# Patient Record
Sex: FEMALE | Race: WHITE | NOT HISPANIC OR LATINO
[De-identification: names, ages, dates, MRNs, and addresses within clinical notes are randomized per-mention and may not be internally consistent; named-entity substitution may affect disease eponyms.]

---

## 2018-12-13 PROBLEM — Z96.1 PSEUDOPHAKIA OF BOTH EYES: Status: ACTIVE | Noted: 2018-12-13

## 2018-12-13 PROBLEM — H43.812 PVD (POSTERIOR VITREOUS DETACHMENT), LEFT EYE: Status: ACTIVE | Noted: 2018-12-13

## 2019-06-20 ENCOUNTER — HOSPITAL (OUTPATIENT)
Dept: OTHER | Age: 59
End: 2019-06-20
Attending: FAMILY MEDICINE

## 2019-06-20 LAB
CONTROL LINE: PRESENT
Lab: CLEAR
STREP POC: NEGATIVE
UA APPEAR POC: CLEAR
UA BILI POC: NEGATIVE
UA BLOOD POC: ABNORMAL
UA COLOR POC: YELLOW
UA GLUCOSE POC: NEGATIVE
UA KETONES POC: NEGATIVE
UA LEUK EST POC: ABNORMAL
UA NITRITE POC: POSITIVE
UA PH POC: 5.5 (ref 5–7)
UA PROTEIN POC: NEGATIVE
UA SPEC GRAV POC: 1.02 (ref 1.01–1.02)
UA UROBILIN POC: 0.2 MG/DL

## 2019-08-27 ENCOUNTER — WALK IN (OUTPATIENT)
Dept: URGENT CARE | Age: 59
End: 2019-08-27

## 2019-08-27 VITALS
BODY MASS INDEX: 22.13 KG/M2 | TEMPERATURE: 98.4 F | WEIGHT: 146 LBS | HEIGHT: 68 IN | SYSTOLIC BLOOD PRESSURE: 122 MMHG | OXYGEN SATURATION: 98 % | DIASTOLIC BLOOD PRESSURE: 60 MMHG | HEART RATE: 84 BPM

## 2019-08-27 DIAGNOSIS — H02.843 SWELLING OF RIGHT EYELID: Primary | ICD-10-CM

## 2019-08-27 PROCEDURE — 99203 OFFICE O/P NEW LOW 30 MIN: CPT | Performed by: NURSE PRACTITIONER

## 2019-08-27 SDOH — HEALTH STABILITY: MENTAL HEALTH: HOW OFTEN DO YOU HAVE A DRINK CONTAINING ALCOHOL?: NEVER

## 2019-08-27 ASSESSMENT — VISUAL ACUITY: OU: 1

## 2023-11-02 RX ORDER — SENNOSIDES 8.6 MG
8.6 TABLET ORAL NIGHTLY
COMMUNITY

## 2023-11-02 RX ORDER — ALENDRONATE SODIUM 70 MG/1
70 TABLET ORAL
COMMUNITY

## 2023-11-02 RX ORDER — ASPIRIN 81 MG/1
81 TABLET ORAL DAILY
Status: ON HOLD | COMMUNITY
End: 2023-11-25

## 2023-11-02 RX ORDER — GABAPENTIN 300 MG/1
300 CAPSULE ORAL NIGHTLY
Status: ON HOLD | COMMUNITY
End: 2023-11-25

## 2023-11-21 ENCOUNTER — ANESTHESIA EVENT (OUTPATIENT)
Dept: SURGERY | Facility: HOSPITAL | Age: 63
End: 2023-11-21
Payer: COMMERCIAL

## 2023-11-22 ENCOUNTER — HOSPITAL ENCOUNTER (INPATIENT)
Facility: HOSPITAL | Age: 63
LOS: 3 days | Discharge: HOME | End: 2023-11-25
Attending: ORTHOPAEDIC SURGERY | Admitting: ORTHOPAEDIC SURGERY
Payer: COMMERCIAL

## 2023-11-22 ENCOUNTER — APPOINTMENT (OUTPATIENT)
Dept: GENERAL RADIOLOGY | Facility: HOSPITAL | Age: 63
End: 2023-11-22
Attending: ORTHOPAEDIC SURGERY
Payer: COMMERCIAL

## 2023-11-22 ENCOUNTER — ANESTHESIA (OUTPATIENT)
Dept: SURGERY | Facility: HOSPITAL | Age: 63
End: 2023-11-22
Payer: COMMERCIAL

## 2023-11-22 PROBLEM — M54.16 LUMBAR RADICULOPATHY: Status: ACTIVE | Noted: 2023-11-22

## 2023-11-22 PROCEDURE — 0SG0071 FUSION OF LUMBAR VERTEBRAL JOINT WITH AUTOLOGOUS TISSUE SUBSTITUTE, POSTERIOR APPROACH, POSTERIOR COLUMN, OPEN APPROACH: ICD-10-PCS | Performed by: ORTHOPAEDIC SURGERY

## 2023-11-22 PROCEDURE — 0ST20ZZ RESECTION OF LUMBAR VERTEBRAL DISC, OPEN APPROACH: ICD-10-PCS | Performed by: ORTHOPAEDIC SURGERY

## 2023-11-22 PROCEDURE — 0SJ00ZZ INSPECTION OF LUMBAR VERTEBRAL JOINT, OPEN APPROACH: ICD-10-PCS | Performed by: ORTHOPAEDIC SURGERY

## 2023-11-22 PROCEDURE — 01NB0ZZ RELEASE LUMBAR NERVE, OPEN APPROACH: ICD-10-PCS | Performed by: ORTHOPAEDIC SURGERY

## 2023-11-22 PROCEDURE — 76000 FLUOROSCOPY <1 HR PHYS/QHP: CPT | Performed by: ORTHOPAEDIC SURGERY

## 2023-11-22 PROCEDURE — 0QS00ZZ REPOSITION LUMBAR VERTEBRA, OPEN APPROACH: ICD-10-PCS | Performed by: ORTHOPAEDIC SURGERY

## 2023-11-22 PROCEDURE — 0SG00AJ FUSION OF LUMBAR VERTEBRAL JOINT WITH INTERBODY FUSION DEVICE, POSTERIOR APPROACH, ANTERIOR COLUMN, OPEN APPROACH: ICD-10-PCS | Performed by: ORTHOPAEDIC SURGERY

## 2023-11-22 PROCEDURE — 4A11X4G MONITORING OF PERIPHERAL NERVOUS ELECTRICAL ACTIVITY, INTRAOPERATIVE, EXTERNAL APPROACH: ICD-10-PCS | Performed by: ORTHOPAEDIC SURGERY

## 2023-11-22 DEVICE — SCREW SPNL DIA5.5MM OPN TULIP LOK RELINE: Type: IMPLANTABLE DEVICE | Site: BACK | Status: FUNCTIONAL

## 2023-11-22 DEVICE — COHERE TLIF-O, 14X10X30MM 12°
Type: IMPLANTABLE DEVICE | Site: BACK | Status: FUNCTIONAL
Brand: COHERE

## 2023-11-22 DEVICE — OSTEOCEL PRO LARGE BULK BUY: Type: IMPLANTABLE DEVICE | Site: BACK | Status: FUNCTIONAL

## 2023-11-22 DEVICE — SCREW SPNL L45MM DIA6.5MM POST: Type: IMPLANTABLE DEVICE | Site: BACK | Status: FUNCTIONAL

## 2023-11-22 DEVICE — ROD SPNL L65MM DIA5.5MM POST: Type: IMPLANTABLE DEVICE | Site: BACK | Status: FUNCTIONAL

## 2023-11-22 DEVICE — IMPLANTABLE DEVICE: Type: IMPLANTABLE DEVICE | Site: BACK | Status: FUNCTIONAL

## 2023-11-22 DEVICE — ATTRAX® SCAFFOLD STRIPS, SMALL
Type: IMPLANTABLE DEVICE | Site: BACK | Status: FUNCTIONAL
Brand: ATTRAX

## 2023-11-22 DEVICE — SCREW SPNL L45MM OD6.5MM 2C REDUC RELINE: Type: IMPLANTABLE DEVICE | Site: BACK | Status: FUNCTIONAL

## 2023-11-22 DEVICE — K WIRE FIX NIT BVL BLNT TIP PRECEPT: Type: IMPLANTABLE DEVICE | Site: BACK

## 2023-11-22 DEVICE — BONE GRAFT KIT 7510100 INFUSE X SMALL
Type: IMPLANTABLE DEVICE | Site: BACK | Status: FUNCTIONAL
Brand: INFUSE® BONE GRAFT

## 2023-11-22 DEVICE — EXTENSION SPNL REDUC TULIP RELINE-O MOD MAS: Type: IMPLANTABLE DEVICE | Site: BACK | Status: FUNCTIONAL

## 2023-11-22 RX ORDER — LIDOCAINE HYDROCHLORIDE 10 MG/ML
INJECTION, SOLUTION EPIDURAL; INFILTRATION; INTRACAUDAL; PERINEURAL AS NEEDED
Status: DISCONTINUED | OUTPATIENT
Start: 2023-11-22 | End: 2023-11-22 | Stop reason: SURG

## 2023-11-22 RX ORDER — HYDROCODONE BITARTRATE AND ACETAMINOPHEN 5; 325 MG/1; MG/1
1 TABLET ORAL ONCE AS NEEDED
Status: DISCONTINUED | OUTPATIENT
Start: 2023-11-22 | End: 2023-11-22 | Stop reason: HOSPADM

## 2023-11-22 RX ORDER — SENNOSIDES 8.6 MG
17.2 TABLET ORAL NIGHTLY
Status: DISCONTINUED | OUTPATIENT
Start: 2023-11-22 | End: 2023-11-25

## 2023-11-22 RX ORDER — GABAPENTIN 300 MG/1
300 CAPSULE ORAL NIGHTLY
Status: DISCONTINUED | OUTPATIENT
Start: 2023-11-22 | End: 2023-11-24

## 2023-11-22 RX ORDER — DOCUSATE SODIUM 100 MG/1
100 CAPSULE, LIQUID FILLED ORAL 2 TIMES DAILY
Status: DISCONTINUED | OUTPATIENT
Start: 2023-11-22 | End: 2023-11-25

## 2023-11-22 RX ORDER — HYDROCODONE BITARTRATE AND ACETAMINOPHEN 5; 325 MG/1; MG/1
TABLET ORAL
COMMUNITY
Start: 2023-11-14 | End: 2023-11-25

## 2023-11-22 RX ORDER — DIPHENHYDRAMINE HCL 25 MG
25 CAPSULE ORAL EVERY 4 HOURS PRN
Status: DISCONTINUED | OUTPATIENT
Start: 2023-11-22 | End: 2023-11-25

## 2023-11-22 RX ORDER — POLYETHYLENE GLYCOL 3350 17 G/17G
17 POWDER, FOR SOLUTION ORAL DAILY PRN
Status: DISCONTINUED | OUTPATIENT
Start: 2023-11-22 | End: 2023-11-25

## 2023-11-22 RX ORDER — ATORVASTATIN CALCIUM 40 MG/1
40 TABLET, FILM COATED ORAL NIGHTLY
Status: DISCONTINUED | OUTPATIENT
Start: 2023-11-22 | End: 2023-11-25

## 2023-11-22 RX ORDER — GLYCOPYRROLATE 0.2 MG/ML
INJECTION, SOLUTION INTRAMUSCULAR; INTRAVENOUS AS NEEDED
Status: DISCONTINUED | OUTPATIENT
Start: 2023-11-22 | End: 2023-11-22 | Stop reason: SURG

## 2023-11-22 RX ORDER — CEFAZOLIN SODIUM/WATER 2 G/20 ML
2 SYRINGE (ML) INTRAVENOUS EVERY 8 HOURS
Status: COMPLETED | OUTPATIENT
Start: 2023-11-22 | End: 2023-11-22

## 2023-11-22 RX ORDER — PROCHLORPERAZINE EDISYLATE 5 MG/ML
5 INJECTION INTRAMUSCULAR; INTRAVENOUS EVERY 8 HOURS PRN
Status: DISCONTINUED | OUTPATIENT
Start: 2023-11-22 | End: 2023-11-22 | Stop reason: HOSPADM

## 2023-11-22 RX ORDER — MEPERIDINE HYDROCHLORIDE 25 MG/ML
12.5 INJECTION INTRAMUSCULAR; INTRAVENOUS; SUBCUTANEOUS AS NEEDED
Status: DISCONTINUED | OUTPATIENT
Start: 2023-11-22 | End: 2023-11-22 | Stop reason: HOSPADM

## 2023-11-22 RX ORDER — CELECOXIB 200 MG/1
200 CAPSULE ORAL ONCE
Status: COMPLETED | OUTPATIENT
Start: 2023-11-22 | End: 2023-11-22

## 2023-11-22 RX ORDER — MIDAZOLAM HYDROCHLORIDE 1 MG/ML
INJECTION INTRAMUSCULAR; INTRAVENOUS AS NEEDED
Status: DISCONTINUED | OUTPATIENT
Start: 2023-11-22 | End: 2023-11-22 | Stop reason: SURG

## 2023-11-22 RX ORDER — ONDANSETRON 2 MG/ML
4 INJECTION INTRAMUSCULAR; INTRAVENOUS EVERY 6 HOURS PRN
Status: DISCONTINUED | OUTPATIENT
Start: 2023-11-22 | End: 2023-11-25

## 2023-11-22 RX ORDER — METHOCARBAMOL 750 MG/1
750 TABLET, FILM COATED ORAL EVERY 6 HOURS PRN
Status: DISCONTINUED | OUTPATIENT
Start: 2023-11-22 | End: 2023-11-25

## 2023-11-22 RX ORDER — LEVOTHYROXINE SODIUM 0.05 MG/1
50 TABLET ORAL
Status: DISCONTINUED | OUTPATIENT
Start: 2023-11-23 | End: 2023-11-25

## 2023-11-22 RX ORDER — METHOCARBAMOL 500 MG/1
500 TABLET, FILM COATED ORAL 3 TIMES DAILY
COMMUNITY
Start: 2023-11-14

## 2023-11-22 RX ORDER — PHENYLEPHRINE HCL 10 MG/ML
VIAL (ML) INJECTION AS NEEDED
Status: DISCONTINUED | OUTPATIENT
Start: 2023-11-22 | End: 2023-11-22 | Stop reason: SURG

## 2023-11-22 RX ORDER — HYDROCODONE BITARTRATE AND ACETAMINOPHEN 5; 325 MG/1; MG/1
2 TABLET ORAL ONCE AS NEEDED
Status: DISCONTINUED | OUTPATIENT
Start: 2023-11-22 | End: 2023-11-22 | Stop reason: HOSPADM

## 2023-11-22 RX ORDER — METHOCARBAMOL 100 MG/ML
1 INJECTION, SOLUTION INTRAMUSCULAR; INTRAVENOUS ONCE
Status: COMPLETED | OUTPATIENT
Start: 2023-11-22 | End: 2023-11-22

## 2023-11-22 RX ORDER — ACETAMINOPHEN 500 MG
1000 TABLET ORAL ONCE
Status: DISCONTINUED | OUTPATIENT
Start: 2023-11-22 | End: 2023-11-22 | Stop reason: HOSPADM

## 2023-11-22 RX ORDER — DIPHENHYDRAMINE HYDROCHLORIDE 50 MG/ML
25 INJECTION INTRAMUSCULAR; INTRAVENOUS EVERY 4 HOURS PRN
Status: DISCONTINUED | OUTPATIENT
Start: 2023-11-22 | End: 2023-11-25

## 2023-11-22 RX ORDER — HYDROMORPHONE HYDROCHLORIDE 1 MG/ML
0.4 INJECTION, SOLUTION INTRAMUSCULAR; INTRAVENOUS; SUBCUTANEOUS EVERY 5 MIN PRN
Status: DISCONTINUED | OUTPATIENT
Start: 2023-11-22 | End: 2023-11-22 | Stop reason: HOSPADM

## 2023-11-22 RX ORDER — CEFAZOLIN SODIUM/WATER 2 G/20 ML
2 SYRINGE (ML) INTRAVENOUS ONCE
Status: COMPLETED | OUTPATIENT
Start: 2023-11-22 | End: 2023-11-22

## 2023-11-22 RX ORDER — ONDANSETRON 2 MG/ML
INJECTION INTRAMUSCULAR; INTRAVENOUS AS NEEDED
Status: DISCONTINUED | OUTPATIENT
Start: 2023-11-22 | End: 2023-11-22 | Stop reason: SURG

## 2023-11-22 RX ORDER — LEVOTHYROXINE SODIUM 0.05 MG/1
1 TABLET ORAL DAILY
COMMUNITY
Start: 2023-03-23

## 2023-11-22 RX ORDER — DIAZEPAM 5 MG/1
5 TABLET ORAL EVERY 6 HOURS PRN
Status: DISCONTINUED | OUTPATIENT
Start: 2023-11-22 | End: 2023-11-25

## 2023-11-22 RX ORDER — TRANEXAMIC ACID 10 MG/ML
INJECTION, SOLUTION INTRAVENOUS AS NEEDED
Status: DISCONTINUED | OUTPATIENT
Start: 2023-11-22 | End: 2023-11-22 | Stop reason: SURG

## 2023-11-22 RX ORDER — MIDAZOLAM HYDROCHLORIDE 1 MG/ML
1 INJECTION INTRAMUSCULAR; INTRAVENOUS EVERY 5 MIN PRN
Status: DISCONTINUED | OUTPATIENT
Start: 2023-11-22 | End: 2023-11-22 | Stop reason: HOSPADM

## 2023-11-22 RX ORDER — HYDROMORPHONE HYDROCHLORIDE 1 MG/ML
INJECTION, SOLUTION INTRAMUSCULAR; INTRAVENOUS; SUBCUTANEOUS
Status: COMPLETED
Start: 2023-11-22 | End: 2023-11-22

## 2023-11-22 RX ORDER — ONDANSETRON 2 MG/ML
4 INJECTION INTRAMUSCULAR; INTRAVENOUS ONCE
Status: COMPLETED | OUTPATIENT
Start: 2023-11-22 | End: 2023-11-22

## 2023-11-22 RX ORDER — KETAMINE HYDROCHLORIDE 50 MG/ML
INJECTION, SOLUTION, CONCENTRATE INTRAMUSCULAR; INTRAVENOUS AS NEEDED
Status: DISCONTINUED | OUTPATIENT
Start: 2023-11-22 | End: 2023-11-22 | Stop reason: SURG

## 2023-11-22 RX ORDER — PROCHLORPERAZINE EDISYLATE 5 MG/ML
5 INJECTION INTRAMUSCULAR; INTRAVENOUS EVERY 8 HOURS PRN
Status: DISCONTINUED | OUTPATIENT
Start: 2023-11-22 | End: 2023-11-25

## 2023-11-22 RX ORDER — HYDROMORPHONE HYDROCHLORIDE 1 MG/ML
0.4 INJECTION, SOLUTION INTRAMUSCULAR; INTRAVENOUS; SUBCUTANEOUS EVERY 2 HOUR PRN
Status: DISCONTINUED | OUTPATIENT
Start: 2023-11-22 | End: 2023-11-25

## 2023-11-22 RX ORDER — BISACODYL 10 MG
10 SUPPOSITORY, RECTAL RECTAL
Status: DISCONTINUED | OUTPATIENT
Start: 2023-11-22 | End: 2023-11-25

## 2023-11-22 RX ORDER — HYDROMORPHONE HYDROCHLORIDE 1 MG/ML
0.2 INJECTION, SOLUTION INTRAMUSCULAR; INTRAVENOUS; SUBCUTANEOUS EVERY 5 MIN PRN
Status: DISCONTINUED | OUTPATIENT
Start: 2023-11-22 | End: 2023-11-22 | Stop reason: HOSPADM

## 2023-11-22 RX ORDER — HYDROMORPHONE HYDROCHLORIDE 1 MG/ML
0.8 INJECTION, SOLUTION INTRAMUSCULAR; INTRAVENOUS; SUBCUTANEOUS EVERY 2 HOUR PRN
Status: DISCONTINUED | OUTPATIENT
Start: 2023-11-22 | End: 2023-11-25

## 2023-11-22 RX ORDER — ACETAMINOPHEN 500 MG
1000 TABLET ORAL ONCE AS NEEDED
Status: DISCONTINUED | OUTPATIENT
Start: 2023-11-22 | End: 2023-11-22 | Stop reason: HOSPADM

## 2023-11-22 RX ORDER — ONDANSETRON 2 MG/ML
4 INJECTION INTRAMUSCULAR; INTRAVENOUS EVERY 6 HOURS PRN
Status: DISCONTINUED | OUTPATIENT
Start: 2023-11-22 | End: 2023-11-22 | Stop reason: HOSPADM

## 2023-11-22 RX ORDER — SODIUM CHLORIDE, SODIUM LACTATE, POTASSIUM CHLORIDE, CALCIUM CHLORIDE 600; 310; 30; 20 MG/100ML; MG/100ML; MG/100ML; MG/100ML
INJECTION, SOLUTION INTRAVENOUS CONTINUOUS
Status: DISCONTINUED | OUTPATIENT
Start: 2023-11-22 | End: 2023-11-22 | Stop reason: HOSPADM

## 2023-11-22 RX ORDER — ENEMA 19; 7 G/133ML; G/133ML
1 ENEMA RECTAL ONCE AS NEEDED
Status: DISCONTINUED | OUTPATIENT
Start: 2023-11-22 | End: 2023-11-25

## 2023-11-22 RX ORDER — NALOXONE HYDROCHLORIDE 0.4 MG/ML
0.08 INJECTION, SOLUTION INTRAMUSCULAR; INTRAVENOUS; SUBCUTANEOUS AS NEEDED
Status: DISCONTINUED | OUTPATIENT
Start: 2023-11-22 | End: 2023-11-22 | Stop reason: HOSPADM

## 2023-11-22 RX ORDER — KETOROLAC TROMETHAMINE 30 MG/ML
INJECTION, SOLUTION INTRAMUSCULAR; INTRAVENOUS AS NEEDED
Status: DISCONTINUED | OUTPATIENT
Start: 2023-11-22 | End: 2023-11-22 | Stop reason: SURG

## 2023-11-22 RX ORDER — DEXAMETHASONE SODIUM PHOSPHATE 4 MG/ML
VIAL (ML) INJECTION AS NEEDED
Status: DISCONTINUED | OUTPATIENT
Start: 2023-11-22 | End: 2023-11-22 | Stop reason: SURG

## 2023-11-22 RX ORDER — OXYCODONE HYDROCHLORIDE 10 MG/1
10 TABLET ORAL EVERY 4 HOURS PRN
Status: DISCONTINUED | OUTPATIENT
Start: 2023-11-22 | End: 2023-11-23

## 2023-11-22 RX ORDER — SODIUM CHLORIDE, SODIUM LACTATE, POTASSIUM CHLORIDE, CALCIUM CHLORIDE 600; 310; 30; 20 MG/100ML; MG/100ML; MG/100ML; MG/100ML
INJECTION, SOLUTION INTRAVENOUS CONTINUOUS
Status: DISCONTINUED | OUTPATIENT
Start: 2023-11-22 | End: 2023-11-25

## 2023-11-22 RX ORDER — OXYCODONE HYDROCHLORIDE 5 MG/1
5 TABLET ORAL EVERY 4 HOURS PRN
Status: DISCONTINUED | OUTPATIENT
Start: 2023-11-22 | End: 2023-11-23

## 2023-11-22 RX ORDER — HYDROMORPHONE HYDROCHLORIDE 1 MG/ML
0.6 INJECTION, SOLUTION INTRAMUSCULAR; INTRAVENOUS; SUBCUTANEOUS EVERY 5 MIN PRN
Status: DISCONTINUED | OUTPATIENT
Start: 2023-11-22 | End: 2023-11-22 | Stop reason: HOSPADM

## 2023-11-22 RX ADMIN — SODIUM CHLORIDE, SODIUM LACTATE, POTASSIUM CHLORIDE, CALCIUM CHLORIDE: 600; 310; 30; 20 INJECTION, SOLUTION INTRAVENOUS at 09:06:00

## 2023-11-22 RX ADMIN — TRANEXAMIC ACID 1000 MG: 10 INJECTION, SOLUTION INTRAVENOUS at 07:57:00

## 2023-11-22 RX ADMIN — PHENYLEPHRINE HCL 100 MCG: 10 MG/ML VIAL (ML) INJECTION at 10:02:00

## 2023-11-22 RX ADMIN — METHOCARBAMOL 1 G: 100 INJECTION, SOLUTION INTRAMUSCULAR; INTRAVENOUS at 09:35:00

## 2023-11-22 RX ADMIN — PHENYLEPHRINE HCL 100 MCG: 10 MG/ML VIAL (ML) INJECTION at 08:22:00

## 2023-11-22 RX ADMIN — SODIUM CHLORIDE, SODIUM LACTATE, POTASSIUM CHLORIDE, CALCIUM CHLORIDE: 600; 310; 30; 20 INJECTION, SOLUTION INTRAVENOUS at 07:48:00

## 2023-11-22 RX ADMIN — KETOROLAC TROMETHAMINE 30 MG: 30 INJECTION, SOLUTION INTRAMUSCULAR; INTRAVENOUS at 10:00:00

## 2023-11-22 RX ADMIN — PHENYLEPHRINE HCL 100 MCG: 10 MG/ML VIAL (ML) INJECTION at 08:42:00

## 2023-11-22 RX ADMIN — DEXAMETHASONE SODIUM PHOSPHATE 8 MG: 4 MG/ML VIAL (ML) INJECTION at 07:58:00

## 2023-11-22 RX ADMIN — PHENYLEPHRINE HCL 100 MCG: 10 MG/ML VIAL (ML) INJECTION at 07:55:00

## 2023-11-22 RX ADMIN — PHENYLEPHRINE HCL 100 MCG: 10 MG/ML VIAL (ML) INJECTION at 08:52:00

## 2023-11-22 RX ADMIN — LIDOCAINE HYDROCHLORIDE 50 MG: 10 INJECTION, SOLUTION EPIDURAL; INFILTRATION; INTRACAUDAL; PERINEURAL at 07:51:00

## 2023-11-22 RX ADMIN — CEFAZOLIN SODIUM/WATER 2 G: 2 G/20 ML SYRINGE (ML) INTRAVENOUS at 07:53:00

## 2023-11-22 RX ADMIN — PHENYLEPHRINE HCL 200 MCG: 10 MG/ML VIAL (ML) INJECTION at 08:11:00

## 2023-11-22 RX ADMIN — GLYCOPYRROLATE 0.2 MG: 0.2 INJECTION, SOLUTION INTRAMUSCULAR; INTRAVENOUS at 08:09:00

## 2023-11-22 RX ADMIN — PHENYLEPHRINE HCL 100 MCG: 10 MG/ML VIAL (ML) INJECTION at 08:03:00

## 2023-11-22 RX ADMIN — MIDAZOLAM HYDROCHLORIDE 2 MG: 1 INJECTION INTRAMUSCULAR; INTRAVENOUS at 07:48:00

## 2023-11-22 RX ADMIN — PHENYLEPHRINE HCL 100 MCG: 10 MG/ML VIAL (ML) INJECTION at 08:09:00

## 2023-11-22 RX ADMIN — SODIUM CHLORIDE, SODIUM LACTATE, POTASSIUM CHLORIDE, CALCIUM CHLORIDE: 600; 310; 30; 20 INJECTION, SOLUTION INTRAVENOUS at 10:14:00

## 2023-11-22 RX ADMIN — PHENYLEPHRINE HCL 100 MCG: 10 MG/ML VIAL (ML) INJECTION at 09:38:00

## 2023-11-22 RX ADMIN — KETAMINE HYDROCHLORIDE 25 MG: 50 INJECTION, SOLUTION, CONCENTRATE INTRAMUSCULAR; INTRAVENOUS at 07:51:00

## 2023-11-22 RX ADMIN — ONDANSETRON 4 MG: 2 INJECTION INTRAMUSCULAR; INTRAVENOUS at 10:00:00

## 2023-11-22 RX ADMIN — PHENYLEPHRINE HCL 200 MCG: 10 MG/ML VIAL (ML) INJECTION at 08:12:00

## 2023-11-22 NOTE — BRIEF OP NOTE
Pre-Operative Diagnosis: KYPHOSIS, HISTORY FUSION, STENOSIS, DISCHERNIATION     Post-Operative Diagnosis: KYPHOSIS, HISTORY FUSION, STENOSIS, DISCHERNIATION      Procedure Performed:   LUMBAR 3 - LUMBAR 4, LUMBAR 4 - LUMBAR 5 REVISION DECOMPRESSION WITH TRANSFORAMINAL LUMBAR INTERBODY FUSION-INSTRUMENTED    Surgeon(s) and Role:     * Beka Baum MD - Primary    Assistant(s):  PA: Aisha Beal PA-C     Surgical Findings: Above     Specimen: none     Estimated Blood Loss: Blood Output: 50 mL (11/22/2023  9:37 AM)      Dictation Number:      Loly Blanton PA-C  11/22/2023  10:07 AM

## 2023-11-22 NOTE — PLAN OF CARE
Patient alert and oriented x4. VSS, on RA. Moderate pain/spasms reported to lower back and RLE, PRN medications given with reported relief. Gel ice wrap intermittently placed. Chairback brace in place when OOB, pt got OOB x1 person assist. Carr in place, draining clear yellow urine. Pt tolerating diet, denies N/V. Pt denies N/T. Microfoam in place to lower back incision site, CDI. DELTA's and SCD's in place. Plan: Pain management, PT/OT to see tomorrow. 1730: Pt up and ambulating in halls, tolerating PO intake.  Carr catheter removed, DTV 1938

## 2023-11-22 NOTE — ANESTHESIA POSTPROCEDURE EVALUATION
[facility] Patient Status:  Inpatient   Age/Gender 58year old female MRN [id number]   Location [address] [facility] Attending Monae Gill MD   Hosp Day # 0 PCP [facility]       Anesthesia Post-op Note    LUMBAR 3 - LUMBAR 4, LUMBAR 4 - LUMBAR 5 REVISION DECOMPRESSION WITH TRANSFORAMINAL LUMBAR INTERBODY FUSION-INSTRUMENTED    Procedure Summary       Date: 11/22/23 Room / Location: [facility]4 [facility] MAIN OR 11 / 140[address] [facility] MAIN OR    Anesthesia Start: 9976 Anesthesia Stop: 6188    Procedures:       LUMBAR 3 - LUMBAR 4, LUMBAR 4 - LUMBAR 5 REVISION DECOMPRESSION WITH TRANSFORAMINAL LUMBAR INTERBODY FUSION-INSTRUMENTED (Left: Spine Lumbar)      INTRAOPERATIVE NEURO MONITORING (Spine Lumbar) Diagnosis: (KYPHOSIS, HISTORY FUSION, STENOSIS, DISCHERNIATION)    Surgeons: Monae Gill MD Anesthesiologist: Kriss Baker MD    Anesthesia Type: general ASA Status: 2            Anesthesia Type: general    Vitals Value Taken Time   /81 11/22/23 1014   Temp 97.2 11/22/23 1014   Pulse 113 11/22/23 1014   Resp 12 11/22/23 1014   SpO2 99 % 11/22/23 1014   Vitals shown include unfiled device data. Patient Location: PACU    Anesthesia Type: general    Airway Patency: patent and extubated    Postop Pain Control: adequate    Mental Status: preanesthetic baseline    Nausea/Vomiting: none    Cardiopulmonary/Hydration status: stable euvolemic    Complications: no apparent anesthesia related complications    Postop vital signs: stable    Dental Exam: Unchanged from Preop    Patient to be discharged from PACU when criteria met.

## 2023-11-22 NOTE — ANESTHESIA PROCEDURE NOTES
Airway  Date/Time: 11/22/2023 7:52 AM  Urgency: elective    Airway not difficult    General Information and Staff    Patient location during procedure: OR  Anesthesiologist: Nikos Bonner MD  Resident/CRNA: Wyatt Her CRNA  Performed: CRNA   Performed by: Wyatt Her CRNA  Authorized by: Nikos Bonner MD      Indications and Patient Condition  Indications for airway management: anesthesia  Spontaneous Ventilation: absent  Sedation level: deep  Preoxygenated: yes  Patient position: sniffing  Mask difficulty assessment: 0 - not attempted    Final Airway Details  Final airway type: endotracheal airway      Successful airway: ETT  Cuffed: yes   Successful intubation technique: direct laryngoscopy  Facilitating devices/methods: intubating stylet and cricoid pressure  Endotracheal tube insertion site: oral  Blade: Manny  Blade size: #3  ETT size (mm): 7.0    Cormack-Lehane Classification: grade IIB - view of arytenoids or posterior of glottis only  Placement verified by: capnometry   Cuff volume (mL): 6  Measured from: lips  ETT to lips (cm): 21  Number of attempts at approach: 1  Number of other approaches attempted: 0    Additional Comments  Dentition per pre op

## 2023-11-23 LAB
HCT VFR BLD AUTO: 37.3 %
HGB BLD-MCNC: 12.5 G/DL

## 2023-11-23 PROCEDURE — 85014 HEMATOCRIT: CPT

## 2023-11-23 PROCEDURE — 97116 GAIT TRAINING THERAPY: CPT

## 2023-11-23 PROCEDURE — 85018 HEMOGLOBIN: CPT

## 2023-11-23 PROCEDURE — 97161 PT EVAL LOW COMPLEX 20 MIN: CPT

## 2023-11-23 PROCEDURE — 97165 OT EVAL LOW COMPLEX 30 MIN: CPT

## 2023-11-23 RX ORDER — OXYCODONE AND ACETAMINOPHEN 10; 325 MG/1; MG/1
2 TABLET ORAL EVERY 4 HOURS PRN
Status: DISCONTINUED | OUTPATIENT
Start: 2023-11-23 | End: 2023-11-25

## 2023-11-23 RX ORDER — OXYCODONE AND ACETAMINOPHEN 10; 325 MG/1; MG/1
1 TABLET ORAL EVERY 4 HOURS PRN
Status: DISCONTINUED | OUTPATIENT
Start: 2023-11-23 | End: 2023-11-25

## 2023-11-23 NOTE — PLAN OF CARE
A/o x4. RA//IS. Teds/scd's/ankle pumps. IV SL. Pre-op symptoms included  pain and burning  to BLE pt states preop symptoms improving and now c/o more of just incision pain. Pain managed with po pain medication and muscle relaxer. Up sb with RW. Voiding. LBM 11/21. Microfoam dsg to incision. Denies n/t. PT/OT to see in AM. POC updated with pt. All safety measures in place. Call light within reach instructed pt to call for help or assistance.

## 2023-11-23 NOTE — PLAN OF CARE
Patient alert and oriented x4. VSS, on RA. Moderate/severe pain reported to back, PO PRN pain medication and muscle relaxants given with reported relief. Pt denies N/T. Lower back surgical site with microfoam tape dressing, CDI. Gel ice wrap intermittently used per patient preference/request. Chairback brace when OOB. Pt voiding freely in bathroom. Tolerating diet, denies N/V. Plan: pain management, PT to see again tomorrow.

## 2023-11-23 NOTE — PHYSICAL THERAPY NOTE
PHYSICAL THERAPY EVALUATION - INPATIENT     Room Number: 381/381-A  Evaluation Date: 11/23/2023  Type of Evaluation: Initial  Physician Order: PT Eval and Treat    Presenting Problem: L3-L5 revision decompression w/ fusion  Co-Morbidities : HHL, hypothryoidism, lumbar surgery 2014  Reason for Therapy: Mobility Dysfunction and Discharge Planning    History related to current admission: Patient is a 58year old female admitted on 11/22/2023 from home for L3-L5 revision decompression w/ fusion by Dr. Shalonda Gauthier. PROCEDURE 11/22/23: LUMBAR 3 - LUMBAR 4, LUMBAR 4 - LUMBAR 5 REVISION DECOMPRESSION WITH TRANSFORAMINAL LUMBAR INTERBODY FUSION-INSTRUMENTED    ASSESSMENT   In this PT evaluation, the patient presents with the following impairments incr pain/discomfort, decr awareness/understanding of spine precautions and impact on ADLs and mobility, decr static and dynamic standing balance, decr activity tolerance/endurance, decr functional mobility. These impairments and comorbidities manifest themselves as functional limitations in independent bed mobility, transfers, and gait. The patient is below baseline and would benefit from skilled inpatient PT to address the above deficits to assist patient in returning to prior to level of function. Functional outcome measures completed include [facility]. The -PAC '6-Clicks' Inpatient Basic Mobility Short Form was completed and this patient is demonstrating a Approx Degree of Impairment: 50.57%  degree of impairment in mobility. Research supports that patients with this level of impairment may benefit from home. DISCHARGE RECOMMENDATIONS  PT Discharge Recommendations: Home    PLAN  PT Treatment Plan: Bed mobility; Body mechanics; Coordination;Don/doff brace; Endurance; Energy conservation;Patient education; Family education; Neuromuscular re-educate;Gait training;Range of motion;Strengthening;Stoop training;Stair training;Transfer training;Balance training  Rehab Potential : Good  Frequency (Obs): Daily  Number of Visits to Meet Established Goals: 2      CURRENT GOALS    Goal #1 Patient is able to demonstrate supine - sit EOB @ level: supervision     Goal #2 Patient is able to demonstrate transfers Sit to/from Stand at assistance level: supervision     Goal #3 Patient is able to ambulate 150 feet with assist device:  LRAD  at assistance level: supervision     Goal #4 Pt able to ascend/descend 3 steps without use of rail at supervision level for safe home navigation. Goal #5 Pt able to demonstrate and maintain spine precautions. Goal #6    Goal Comments: Goals established on 2023    HOME SITUATION  Type of Home: House   Home Layout: Two level; Able to live on main level  Stairs to Enter : 3  Railing: No          Lives With: Spouse  Drives: Yes  Patient Owned Equipment: Rolling walker  Patient Regularly Uses: None    Prior Level of Craig: Per pt typically independent with all aspects of functional mobility. Lives in two story home w/ spouse; bed/bath main floor. 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 They have a house in [place] [facility] [place] which she is hoping to be able to go there in two weeks. SUBJECTIVE  \"I have never had this much pain before. \"      OBJECTIVE  Precautions: Spine;Lumbar brace;Bed/chair alarm  Fall Risk: Standard fall risk    WEIGHT BEARING RESTRICTION  Weight Bearing Restriction: None                PAIN ASSESSMENT  Ratin  Location: low back, incisional  Management Techniques: Activity promotion; Body mechanics;Breathing techniques;Repositioning    COGNITION  Following Commands:  follows multistep commands consistently  Safety Judgement:  decreased awareness of need for assistance and decreased awareness of need for safety  Awareness of Errors:  assistance required to identify errors made, assistance required to correct errors made, and decreased awareness of errors   Awareness of Deficits:  decreased awareness of deficits    RANGE OF MOTION AND STRENGTH ASSESSMENT  Upper extremity ROM and strength are within functional limits   Lower extremity ROM is within functional limits   Lower extremity strength is within functional limits       BALANCE  Static Sitting: Fair +  Dynamic Sitting: Fair  Static Standing: Fair -  Dynamic Standing: Poor +    ADDITIONAL TESTS                                    ACTIVITY TOLERANCE                         O2 WALK       NEUROLOGICAL FINDINGS                        AM-PAC '6-Clicks' INPATIENT SHORT FORM - BASIC MOBILITY  How much difficulty does the patient currently have. .. Patient Difficulty: Turning over in bed (including adjusting bedclothes, sheets and blankets)?: A Little   Patient Difficulty: Sitting down on and standing up from a chair with arms (e.g., wheelchair, bedside commode, etc.): A Little   Patient Difficulty: Moving from lying on back to sitting on the side of the bed?: A Little   How much help from another person does the patient currently need. ..    Help from Another: Moving to and from a bed to a chair (including a wheelchair)?: A Little   Help from Another: Need to walk in hospital room?: A Little   Help from Another: Climbing 3-5 steps with a railing?: A Lot       AM-PAC Score:  Raw Score: 17   Approx Degree of Impairment: 50.57%   Standardized Score (AM-PAC Scale): 42.13   CMS Modifier (G-Code): CK    FUNCTIONAL ABILITY STATUS  Gait Assessment   Functional Mobility/Gait Assessment  Gait Assistance: Contact guard assist;Minimum assistance  Distance (ft): 100  Assistive Device: Rolling walker  Pattern: Shuffle    Skilled Therapy Provided     Bed Mobility:  Rolling: educated pt on log roll technique as spine precaution- pt requiring Jeremy with max cueing for sequencing to complete   Sidelying to sit:  Jeremy, assist at trunk to reach static sitting EOB  Sit to supine: NT     Transfer Mobility:  Sit to stand: CGA to Jeremy to RW- vc for hand placement, but preferred to pull via BUE on RW   Stand to sit: Jeremy, poor eccentric control   Gait = CGA to Jeremy w/ RW x 100 feet, shuffle gait pattern. A few instances of BLE buckling (possibly due to pain response)    Therapist's Comments: Patient presents laying all twisted in bed. Discussed role and goal of physical therapy. Pt in agreement to session. Pt reporting 8/10 pain at this time. Educated on use of log roll technique for bed mobility. Educated on spine precautions: no bending, lifting, twisting. Pt verbalizes understanding. Bed mobility completed at [facility] includes the [facility], able to demonstrate log roll technique with Jeremy and max cueing for sequencing and to maintain spine precautions. Once sitting EOB, chairback brace donned at [address]. Sit to stand transfer at CGA/Jeremy to RW. Verbal cues required for hand placement. Pt ambulated to the bathroom w/ RW at Jeremy, two instances of knee buckling requiring Jeremy to regain static standing balance. Toilet transfer at Count includes the [facility], very poor eccentric control, and again did not follow through with spine precautions. Ambulated 100 feet with RW at CGA/Jeremy. Pt upright in bedside chair at end of session. Discussed importance of continued out of bed functional mobility. Encouraged pt to ambulate with RN/PCT staff 4 times daily per spine surgeons recommendations. Pt verbalizes understanding. Exercise/Education Provided:  Bed mobility  Body mechanics  Don/Doff ortho/prosthesis  Energy conservation  Functional activity tolerated  Manual therapy  Transfer training    Patient End of Session: Up in chair; With [facility] staff;Needs met;Call light within reach;RN aware of session/findings; All patient questions and concerns addressed; Discussed recommendations with /      Patient Evaluation Complexity Level:  History Moderate - 1 or 2 personal factors and/or co-morbidities   Examination of body systems Low - addressing 1-2 elements   Clinical Presentation Low - Stable   Clinical Decision Making Low - Stable       PT Session Time: 30 minutes  Gait Training: 15 minutes  Therapeutic Activity:  minutes  Neuromuscular Re-education:  minutes  Therapeutic Exercise:  minutes

## 2023-11-24 ENCOUNTER — APPOINTMENT (OUTPATIENT)
Dept: CT IMAGING | Facility: HOSPITAL | Age: 63
End: 2023-11-24
Attending: PHYSICIAN ASSISTANT
Payer: COMMERCIAL

## 2023-11-24 PROCEDURE — 97116 GAIT TRAINING THERAPY: CPT

## 2023-11-24 PROCEDURE — 97530 THERAPEUTIC ACTIVITIES: CPT

## 2023-11-24 PROCEDURE — 72131 CT LUMBAR SPINE W/O DYE: CPT | Performed by: PHYSICIAN ASSISTANT

## 2023-11-24 RX ORDER — DEXAMETHASONE SODIUM PHOSPHATE 10 MG/ML
10 INJECTION, SOLUTION INTRAMUSCULAR; INTRAVENOUS EVERY 8 HOURS
Status: DISCONTINUED | OUTPATIENT
Start: 2023-11-24 | End: 2023-11-24

## 2023-11-24 RX ORDER — DEXAMETHASONE SODIUM PHOSPHATE 4 MG/ML
10 VIAL (ML) INJECTION EVERY 8 HOURS
Status: DISCONTINUED | OUTPATIENT
Start: 2023-11-24 | End: 2023-11-24

## 2023-11-24 RX ORDER — GABAPENTIN 300 MG/1
300 CAPSULE ORAL 3 TIMES DAILY
Status: DISCONTINUED | OUTPATIENT
Start: 2023-11-24 | End: 2023-11-25

## 2023-11-24 RX ORDER — PSEUDOEPHEDRINE HCL 30 MG
100 TABLET ORAL 2 TIMES DAILY PRN
Qty: 20 CAPSULE | Refills: 0 | Status: SHIPPED | OUTPATIENT
Start: 2023-11-24

## 2023-11-24 RX ORDER — DEXAMETHASONE SODIUM PHOSPHATE 10 MG/ML
10 INJECTION, SOLUTION INTRAMUSCULAR; INTRAVENOUS EVERY 8 HOURS
Status: DISCONTINUED | OUTPATIENT
Start: 2023-11-24 | End: 2023-11-25

## 2023-11-24 NOTE — OPERATIVE REPORT
[facility]    PATIENT'S NAME: Ashley Altman   ATTENDING PHYSICIAN: Lucio Mai M.D. OPERATING PHYSICIAN: Lucio Mai M.D. PATIENT ACCOUNT#:   [de-identified]    LOCATION:  [address] [place] [address]  MEDICAL RECORD #:   [id number]       YOB: 1960  ADMISSION DATE:       11/22/2023      OPERATION DATE:  11/22/2023    OPERATIVE REPORT    PREOPERATIVE DIAGNOSIS:  L3-4, L4-5 recurrent stenosis, foraminal and extraforaminal disc herniation with kyphosis and spondylosis, status post fusion. POSTOPERATIVE DIAGNOSIS:  L3-4, L4-5 recurrent stenosis, foraminal and extraforaminal disc herniation with kyphosis and spondylosis, status post fusion. PROCEDURE:  L3 through L5 revision decompression, interbody reconstruction with fusion with transforaminal lumbar interbody fusion at L4-5, exploration of fusion at L3-4, and revision posterior fusion L3-4 and revision decompression. INDICATIONS:  The patient had failed reasonable conservative measures which are outlined in the patient's clinic medical record. Physical therapy, medical management, injections, alternative treatment are among those offered unless motor deficits are progressive. Indications for surgery are based on scientific literature and IVON guidelines. A thorough meeting with the patient and at least one key caregiver was had. The risks and benefits were discussed in significant detail. The risks include, but are not limited to, bleeding, infection, spinal leaks, nerve injuries, hardware failure/migration, pseudarthrosis, adjacent segment disease, and need for further surgery. I have gone over the operation using models, diagrams, and the patient's own imaging studies. Additional written and digital sources were provided. No guarantees were made. ASSISTANT:  Abby Maurer PA-C. A skilled and experienced assistant was required for the entire surgical procedure.   As a lumbar spinal reconstruction, the procedure is done in immediate proximity to critical neural elements and is done in close proximity to the critical vascular and visceral structures. Much of the surgery is done in and around the cauda equine and its exiting nerves. The complex reconstructive nature of the procedure requires application of screws, rods, and interbody devices. Any assistance, including, but not limited to, retraction, suction, and other means of facilitation of the procedure requires an individual with substantial postgraduate training and substantial spinal surgical experience. ESTIMATED BLOOD LOSS:  50 mL. DESCRIPTION OF PROCEDURE:  After obtaining informed consent, the patient was taken to the operating room. SCDs and DELTA hose were applied. Preoperative antibiotics were delivered. Patient was placed in the prone position. Neuromonitoring leads were put in place and baselines were achieved. An internal twitch test verified the patient had 4 out of 4 twitches. A physician was confirmed as evaluating all neuromonitoring data. All bony prominences were padded. The back was sterilely prepped and draped. AP fluoroscopy was wheeled in. We marked pedicles at all levels outlined above bilaterally. Two separate incisions 1-1/2 fingerbreadths lateral to this approximately an inch and a half long were made with a larger incision on the patient's more symptomatic side. This was done with a #10 blade. Bovie electrocautery was used for hemostasis. Dissection was taken down to the level of the fascia. Fascia was split in paraspinal fashion bilaterally. I-PAS needles were utilized with live neuromonitoring in place. Baselines had been achieved. We had confirmed 4 out of 4 twitches with our colleagues and neuromonitoring. Under fluoroscopic guidance, we targeted the lateral aspect of the pedicles bilaterally. These I-PAS needles were then impacted medially and then checked under lateral fluoroscopy for being in appropriate position.   Bone marrow was aspirated by placing a needle through the skin and through the periosteum and into the pedicle. A 10 mL syringe was used to aspirate several milliliters of bone marrow aspirate. The needle was removed with a twisting motion. All I-PAS needles were found to be well above acceptable thresholds. Styluses were removed. K-wires were applied and advanced. Inner styluses were removed as well. Both sides had dilators applied and had appropriate taps applied. We placed a modular screw with hoop shims attached over the K-wire. Both taps were applied with live neuromonitoring and found to have thresholds well above acceptable limits. We repeated the same steps at distal levels without difficulty. The retractor apparatus was placed over the hoop shims. A medial retractor was sized and applied. All tissues were retracted out of harm's way with regards to muscle in the paraspinal plane and an articulating arm was attached to the retractor. Bovie electrocautery was used to remove remaining tissue over the pars and facet. We did explore the fusion at L3-4. There was a paucity of fusion mass identified posterolaterally, so this would later be regrafted at the conclusion of the procedure. Pre-operative measurements documenting a mismatch between pelvic incidence and lumbar lordosis was made, thus documenting clinically significant kyphosis. As a result, lumbar osteotomies are required to improve the patient's overall sagittal balance. Our attention first turned to the L4-5 level. The facet was osteotomized and resected. A bur was used to remove additional bone. Pars was resected as well. We identified the lateral ligamentum flavum and resected that with a Echevarria ball in place over the dura and a 4 and 5-mm Kerrison. Pars was resected as well. The area was further osteotomized from cranial to caudal pedicle using bur, Kerrison rongeurs, and other instruments.   Neural elements were not harmed during this process. The osteotomy facilitated reduction of the patient's kyphosis by allowing for compression via pedicle screws at the conclusion of the case. A bur was used to remove additional bone. We identified the lateral ligamentum flavum and resected that with a Echevarria ball in place over the dura and a 4 and 5-mm Kerrison. Neural elements were identified and defined for a standard posterior interbody fusion. Significant spinal stenosis was identified consistent with the patient's radiculopathy and deficits. We then were required to perform a technically demanding decompression using fine microcurrettes, Kerrison rongeurs, microinstruments, and magnification. Despite adherence of the compressive pathology to neural elements, we were able to define tissue planes to help facilitate release of neurocompressive pathology. The procedure required additional time and technique beyond that of the interbody fusion itself. Contralateral decompression was undertaken, thus creating a bilateral decompression and bilateral microforaminotomy and hemilaminotomy using undercutting technique. With undercutting technique and a Echevarria ball in place, we excised ligamentum flavum through the bone at the cranial foramina as needed, facilitating a serial cranial decompression and undercutting  microforaminotomy and laminectomy. This was done in an undercutting fashion as well and done to examine the central and contralateral neural elements facilitating central bilateral decompression as well. The traversing nerve root was identified and thoroughly decompressed. Undercutting decompression was undertaken. An extraforaminal and transfacet/transpedicular decompression was utilized to perform a thorough decompression of the exiting nerve root and remove redundant annulus and any herniated nucleus pulposus. Extraforaminal disc herniation was identified and removed.      Our attention then turned to utilization of the separate contralateral incision. With a retractor in place, we localized the far lateral region of the level. We examined areas more farther laterally to facilitate a transpedicular/transforaminal decompression. Nerve root retractor was applied. We were able to retract the traversing nerve root over. Annulotomy was performed. Farther lateral structures and paraspinal muscles were dissected away from the area of the transverse process and farther anteriorly. Psoas muscle tissues were also dissected free in the far lateral plane. Significant lateral compressive disease was identified consistent with the patient's radiculopathy and deficits. We then were required to perform a technically demanding decompression using fine microcurrettes, Kerrison rongeurs, microinstruments, and magnification. Despite adherence of the compressive pathology to neural elements, we were able to define tissue planes to help facilitate release of neurocompressive pathology. The procedure required additional time and technique beyond that of the interbody fusion itself. Curettes, giovani, and pituitary rongeurs were used to remove the rest of the remaining disc in order to perform lateral extraforaminal and transpedicular discectomy. Endplates were prepared. Curettes and osteotomes were used to perform bony anterior release and osteotomize any anterior column ossification. We applied some distraction across the pedicle screws. We sized for a cage. Allograft complex was impacted into the lateral disc space, and the cage was impacted as well with nerve roots safely out of harm's way. Our attention then turned to the adjacent L3-4 level where we repeated the same steps. The retractor apparatus was placed over the hoop shims. A medial retractor was sized and applied. All tissues were retracted out of harm's way with regards to muscle in the paraspinal plane, and an articulating arm was attached to the retractor.   Ben electrocautery was used to remove remaining tissue over the pars and facet. Pre-operative measurements documenting a mismatch between pelvic incidence and lumbar lordosis was made thus documenting clinically significant kyphosis. The facet was osteotomized and resected. A bur was used to remove additional bone. Pars was resected as well. We identified the lateral ligamentum flavum and resected that with a Echevarria ball in place over the dura and a 4 and 5 mm Kerrison. Significant spinal stenosis was identified consistent with the patient's radiculopathy and deficits. We then were required to perform a technically demanding decompression using fine microcurrettes, Kerrison rongeurs, microinstruments, and magnification. Despite adherence of the compressive pathology to neural elements, we were able to define tissue planes to help facilitate release of neurocompressive pathology. The procedure required additional time and technique beyond that of the interbody fusion itself. The traversing nerve root was identified and thoroughly decompressed. Contralateral decompression was undertaken, thus creating a bilateral decompression and bilateral microforaminotomy and hemilaminotomy using undercutting technique. With undercutting technique and a Echevarria ball in place, we excised ligamentum flavum through the bone at the cranial foramina as needed, facilitating a serial cranial decompression and undercutting microforaminotomy and hemilaminotomy versus undercutting hemilaminectomy. This was done in an undercutting fashion and done to examine the central and contralateral neural elements facilitating central bilateral decompression as well.       Because of additional disease and neurocompressive pathology in the extraforaminal space, an extraforaminal and transpedicular decompression was utilized to perform a thorough decompression of the exiting nerve root and remove redundant annulus and any herniated nucleus pulposus. Utilizing the incisions and fascial openings previously utilized for the osteotomy and decompression we proceeded with a posterolateral fusion at the L3-4 levels. Posterior elements were decorticated. Allograft and local bone graft were applied. The wounds were thoroughly irrigated. Hemostasis was obtained. Tulip heads were placed on the contralateral side. Then, with dilators in place, the screws were placed. Instrumentation was placed on the right over K-wires in standard technique and fluoroscopy. Screws were advanced under fluoroscopy. All screws were tested using real-time neuromonitoring. Thresholds were above acceptable values. Rods and top tighteners were applied bilaterally, and final tightening was applied with compression devices to make use of the osteotomy, thereby reducing kyphosis and pelvic incidence and lordosis mismatch. X-rays confirmed appropriate localization of our instrumentation. Valsalva maneuver confirmed that there was no spinal leak. The wound was thoroughly irrigated. Hemostasis was maintained. The fascia was reapproximated bilaterally using 1-0 Vicryl. A 2-0 Vicryl was used for subcutaneous tissues. A running 3-0 subcuticular stitch was applied. Steri-Strips were applied. Sterile dressings were applied. The patient was extubated and taken to the recovery room in stable condition and was neurologically intact on arrival and had improvement of leg pain. SSEPs remained at their baseline. Needle and Ray-Ursula counts were correct at the conclusion of the case.     Dictated By Lucio Mai M.D.  d: 11/22/2023 09:42:34  t: 11/22/2023 17:59:01  Adri Toro [id number]  [facility]/

## 2023-11-24 NOTE — CM/SW NOTE
11/24/23 1600   CM/SW Referral Data   Referral Source Nurse   Reason for Referral Discharge planning   Informant EMR;Clinical Staff Member   Discharge Needs   Anticipated D/C needs Home health care       HOME SITUATION per PT eval:  Type of Home: House   Home Layout: Two level; Able to live on main level  Stairs to Enter : 3  Railing: No     Lives With: Spouse  Drives: Yes  Patient Owned Equipment: Rolling walker  Patient Regularly Uses: None     Prior Level of [place] per PT eval: Per pt typically independent with all aspects of functional mobility. Lives in two story home w/ spouse; bed/bath main floor. 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 They have a house in [facility] which she is hoping to be able to go there in two weeks. Patient is a 59 y/o woman admitted s/p TLIF revision. PT recommending home at [place]. Informed by RN that pt is requesting to have [place] [phone] services at [place]. [facility] via [address]. Await responses for accepting, in network providers. / to remain available for support and/or discharge planning.      Elma Haddad LCSW  Discharge Planner  [phone]

## 2023-11-24 NOTE — PLAN OF CARE
A&Ox4, drowsy. VSS. On room air. . IS encouraged. SCDs on BLE. Ankle pumps encouraged. Tolerating regular diet. Last BM 11/21, see MAR. Voiding freely. Pain controlled. Dressing to  lumbar spine, C/D/I. Ambulating with min assist. Plan is to dc home when medically clear. Patient updated on plan of care. Safety precautions in place. Call light within reach.

## 2023-11-24 NOTE — PLAN OF CARE
Patient A&Ox4 . Vital signs stable . Gets up with min assist with walker and chair back brace. Severe  nerve pain to left leg with movement . Controlled with valium, percocet and gabapentin . Dressing to back clean and dry . Refused gel ice . Voiding with no difficulty . Scd's and teds on . Encouraged use of incentive spirometer . All safety measures in place . Reminded to \"call don't fall' . Plan for possible home if cleared .

## 2023-11-24 NOTE — PHYSICAL THERAPY NOTE
PHYSICAL THERAPY TREATMENT NOTE - INPATIENT    Room Number: 381/381-A     Session: 1     Number of Visits to Meet Established Goals: 2    Presenting Problem: L3-L5 revision decompression w/ fusion  Co-Morbidities : HHL, hypothryoidism, lumbar surgery 2014  ASSESSMENT     Pt seen this PM for bed mobility, transfers, and continued gait training. Pt with high levels of pain, and plans to stay another night per spine PA. Pt shown body mechanics of log roll technique, and sit<>stand to RW. Pt tends to shoot forward with bilateral knees to avoid using glute mm, leg press. Plan for stairs next session. At this time, Pt. presents with decreased balance, impaired strength, difficulty with gait/transfers resulting in downgrade of overall functional mobility. Due to above deficits, Pt will benefit from continued IP PT, so that patient may achieve highest functional independence/return to baseline. DISCHARGE RECOMMENDATIONS  PT Discharge Recommendations: Home     PLAN  PT Treatment Plan: Bed mobility; Body mechanics; Coordination;Don/doff brace; Endurance; Energy conservation;Patient education; Family education; Neuromuscular re-educate;Gait training;Range of motion;Strengthening;Stoop training;Stair training;Transfer training;Balance training  Rehab Potential : Good  Frequency (Obs): Daily    CURRENT GOALS     Goal #1 Patient is able to demonstrate supine - sit EOB @ level: supervision      Goal #2 Patient is able to demonstrate transfers Sit to/from Stand at assistance level: supervision      Goal #3 Patient is able to ambulate 150 feet with assist device:  LRAD  at assistance level: supervision      Goal #4 Pt able to ascend/descend 3 steps without use of rail at supervision level for safe home navigation. Goal #5 Pt able to demonstrate and maintain spine precautions.     Goal #6     Goal Comments: Goals established on 11/23/2023 11/24/2023 all goals ongoing       SUBJECTIVE  \"I am hoping to go home\"    OBJECTIVE  Precautions: Spine;Lumbar brace;Bed/chair alarm    WEIGHT BEARING RESTRICTION  Weight Bearing Restriction: None                PAIN ASSESSMENT   Ratin  Location: low back, incisional  Management Techniques: Activity promotion; Body mechanics;Breathing techniques;Repositioning    BALANCE                                                                                                                       Static Sitting: Fair +  Dynamic Sitting: Fair           Static Standing: Fair -  Dynamic Standing: Poor +    ACTIVITY TOLERANCE                         O2 WALK         AM-PAC '6-Clicks' INPATIENT SHORT FORM - BASIC MOBILITY  How much difficulty does the patient currently have. .. Patient Difficulty: Turning over in bed (including adjusting bedclothes, sheets and blankets)?: A Little   Patient Difficulty: Sitting down on and standing up from a chair with arms (e.g., wheelchair, bedside commode, etc.): A Little   Patient Difficulty: Moving from lying on back to sitting on the side of the bed?: A Little   How much help from another person does the patient currently need. .. Help from Another: Moving to and from a bed to a chair (including a wheelchair)?: A Little   Help from Another: Need to walk in hospital room?: A Little   Help from Another: Climbing 3-5 steps with a railing?: A Lot       AM-PAC Score:  Raw Score: 17   Approx Degree of Impairment: 50.57%   Standardized Score (AM-PAC Scale): 42.13   CMS Modifier (G-Code): CK    FUNCTIONAL ABILITY STATUS  Gait Assessment   Functional Mobility/Gait Assessment  Gait Assistance: Contact guard assist  Distance (ft): 100 x 2  Assistive Device: Rolling walker  Pattern: Shuffle    Skilled Therapy Provided    Bed Mobility:  Rolling: CGA   Supine<>Sit: CGA   Sit<>Supine: Min a (ble mgmt)    Transfer Mobility:  Sit<>Stand: Min A - progressed to [facility]   Stand<>Sit: CGA   Gait: CGA with RW    Therapist's Comments: plan for stairs next session. THERAPEUTIC EXERCISES  Lower Extremity Alternating marching  Ankle pumps     Upper Extremity none     Position Sitting     Repetitions   10   Sets   1     Patient End of Session: In bed;Needs met;Call light within reach;RN aware of session/findings; All patient questions and concerns addressed;Bracing education provided per handout; Alarm set    PT Session Time: 23 minutes  Gait Trainin minutes  Therapeutic Activity: 15 minutes  Therapeutic Exercise: 0 minutes   Neuromuscular Re-education: 0 minutes

## 2023-11-25 VITALS
SYSTOLIC BLOOD PRESSURE: 139 MMHG | WEIGHT: 147 LBS | TEMPERATURE: 97 F | BODY MASS INDEX: 23.07 KG/M2 | HEIGHT: 67 IN | DIASTOLIC BLOOD PRESSURE: 79 MMHG | RESPIRATION RATE: 17 BRPM | OXYGEN SATURATION: 96 % | HEART RATE: 90 BPM

## 2023-11-25 PROCEDURE — 97535 SELF CARE MNGMENT TRAINING: CPT

## 2023-11-25 PROCEDURE — 97530 THERAPEUTIC ACTIVITIES: CPT

## 2023-11-25 PROCEDURE — 90471 IMMUNIZATION ADMIN: CPT

## 2023-11-25 PROCEDURE — 97116 GAIT TRAINING THERAPY: CPT

## 2023-11-25 RX ORDER — OXYCODONE AND ACETAMINOPHEN 10; 325 MG/1; MG/1
1 TABLET ORAL EVERY 4 HOURS PRN
Qty: 40 TABLET | Refills: 0 | Status: SHIPPED | OUTPATIENT
Start: 2023-11-25

## 2023-11-25 RX ORDER — PREDNISONE 20 MG/1
20 TABLET ORAL DAILY
Qty: 7 TABLET | Refills: 0 | Status: SHIPPED | OUTPATIENT
Start: 2023-11-25 | End: 2023-12-02

## 2023-11-25 RX ORDER — GABAPENTIN 300 MG/1
300 CAPSULE ORAL 3 TIMES DAILY
Qty: 90 CAPSULE | Refills: 0 | Status: SHIPPED | OUTPATIENT
Start: 2023-11-25

## 2023-11-25 RX ORDER — ASPIRIN 81 MG/1
81 TABLET ORAL DAILY
Status: SHIPPED | COMMUNITY
Start: 2023-11-27

## 2023-11-25 NOTE — PLAN OF CARE
Patient A&Ox4 . Vitals stable , pain is better today , controlled with percocet and robaxin . Decadron completed . Gets up with min assist with chair back brace and walker . Dressing to back clean and dry . Voiding in bathroom , Last BM 8/21 ,refused MOM , passing flatus . Safety precaution sin place . Reminded to call for any needs . Possible home in am if pain controlled .

## 2023-11-25 NOTE — PHYSICAL THERAPY NOTE
PHYSICAL THERAPY TREATMENT NOTE - INPATIENT    Room Number: 381/381-A     Session: 2    Number of Visits to Meet Established Goals: 2    Presenting Problem: L3-L5 revision decompression w/ fusion  Co-Morbidities : HHL, hypothryoidism, lumbar surgery   ASSESSMENT     Patient currently does not meet criteria for skilled inpatient physical therapy services, however patient will continue to benefit from QID ambulation with nursing staff. Pt is hereby discharged from  [address] RN aware to re-order if there is a decline in mobility status. DISCHARGE RECOMMENDATIONS  PT Discharge Recommendations: Home     PLAN  PT Treatment Plan: Bed mobility; Body mechanics; Coordination;Don/doff brace; Endurance; Energy conservation;Patient education; Family education; Neuromuscular re-educate;Gait training;Range of motion;Strengthening;Stoop training;Stair training;Transfer training;Balance training  Rehab Potential : Good  Frequency (Obs): Daily    CURRENT GOALS     Goal #1 Patient is able to demonstrate supine - sit EOB @ level: supervision      Goal #2 Patient is able to demonstrate transfers Sit to/from Stand at assistance level: supervision      Goal #3 Patient is able to ambulate 150 feet with assist device:  LRAD  at assistance level: supervision      Goal #4 Pt able to ascend/descend 3 steps without use of rail at supervision level for safe home navigation. Goal #5 Pt able to demonstrate and maintain spine precautions. Goal #6     Goal Comments: Goals established on 2023 all goals ongoing       SUBJECTIVE  \"See ya at the grocery store\"    OBJECTIVE  Precautions: Spine;Lumbar brace;Bed/chair alarm    WEIGHT BEARING RESTRICTION  Weight Bearing Restriction: None                PAIN ASSESSMENT   Ratin  Location: low back, incisional  Management Techniques: Activity promotion; Body mechanics;Breathing techniques;Repositioning    BALANCE Static Sitting: Fair +  Dynamic Sitting: Fair           Static Standing: Fair -  Dynamic Standing: Poor +    ACTIVITY TOLERANCE                         O2 WALK         AM-PAC '6-Clicks' INPATIENT SHORT FORM - BASIC MOBILITY  How much difficulty does the patient currently have. .. Patient Difficulty: Turning over in bed (including adjusting bedclothes, sheets and blankets)?: None   Patient Difficulty: Sitting down on and standing up from a chair with arms (e.g., wheelchair, bedside commode, etc.): None   Patient Difficulty: Moving from lying on back to sitting on the side of the bed?: None   How much help from another person does the patient currently need. .. Help from Another: Moving to and from a bed to a chair (including a wheelchair)?: None   Help from Another: Need to walk in hospital room?: None   Help from Another: Climbing 3-5 steps with a railing?: A Little       AM-PAC Score:  Raw Score: 23   Approx Degree of Impairment: 11.2%   Standardized Score (AM-PAC Scale): 56.93   CMS Modifier (G-Code): CI    FUNCTIONAL ABILITY STATUS  Gait Assessment   Functional Mobility/Gait Assessment  Gait Assistance: Supervision  Distance (ft): 10  Assistive Device: Rolling walker  Pattern: Shuffle  Stairs: Stairs  How Many Stairs: 4  Device: 2 Rails  Assist: Supervision  Pattern: Ascend and Descend  Ascend and Descend : Step to    Skilled Therapy Provided    Bed Mobility:  Rolling:    Supine<>Sit:    Sit<>Supine: NT    Transfer Mobility:  Sit<>Stand: SBA   Stand<>Sit: SBA   Gait: SBA with RW    Therapist's Comments: extra time as Pt spotted therapy dog      THERAPEUTIC EXERCISES  Lower Extremity Alternating marching  Ankle pumps     Upper Extremity none     Position Sitting     Repetitions   10   Sets   1     Patient End of Session: Up in chair;Needs met;Call light within reach;RN aware of session/findings; All patient questions and concerns addressed;Bracing education provided per handout; Alarm set    PT Session Time: 15 minutes  Gait Trainin minutes  Therapeutic Activity: 0 minutes  Therapeutic Exercise: 0 minutes   Neuromuscular Re-education: 0 minutes

## 2023-11-25 NOTE — PLAN OF CARE
Patient discharging home with family. Discharge instructions given. Dressing dry and intact. Instructed on dressing change. Followup instructions reviewed.

## 2023-11-25 NOTE — CM/SW NOTE
11/25/23 0900   Discharge disposition   Expected discharge disposition Home or Self   Discharge transportation Private car     Recommendations indicated home, pt requested [facility] services per SW assessment. SW spoke to pt today, in which pt has now declined [facility] services. Pt inquired about a temporary handicap parking pass. Per online search, SW informed pt would need to apply through the [facility] and have MD complete a form indicating pt's disability. SW/CM to remain available for support and/or discharge planning.      Elena Gaston, [address]

## (undated) DEVICE — LAMINECTOMY CDS: Brand: MEDLINE INDUSTRIES, INC.

## (undated) DEVICE — SLEEVE COMPR M KNEE LEN SGL USE KENDALL SCD

## (undated) DEVICE — C-ARM: Brand: UNBRANDED

## (undated) DEVICE — STERILE POLYISOPRENE POWDER-FREE SURGICAL GLOVES: Brand: PROTEXIS

## (undated) DEVICE — SUTURE VCRL SZ 0 L18IN ABSRB VLT L36MM CT-1

## (undated) DEVICE — UNDYED BRAIDED (POLYGLACTIN 910), SYNTHETIC ABSORBABLE SUTURE: Brand: COATED VICRYL

## (undated) DEVICE — GLV SURG SZ 7.5 THK10MIL WHT ISOLEX SYN

## (undated) DEVICE — MARKER SKIN PREP RESIST STRL

## (undated) DEVICE — LIGHT HANDLE

## (undated) DEVICE — NEEDLE NRV STIM DMND TIP INSUL PEDCL ACCS SYS

## (undated) DEVICE — KIT ACCS MAS TRANSFORAMINAL LUM IF 2 MAXCESS

## (undated) DEVICE — Device: Brand: INTELLICART™

## (undated) DEVICE — SOLUTION IRRIG 1000ML 0.9% NACL USP BTL

## (undated) DEVICE — COVER,TABLE,44X90,STERILE: Brand: MEDLINE

## (undated) DEVICE — DRAPE UTIL L W18XL24IN PLAS STR ADH REINF

## (undated) DEVICE — KIT HEMSTAT MTRX 8ML PORCINE GEL HUM THROM

## (undated) DEVICE — KIT POSITIONING PROAXIS PRONEV

## (undated) DEVICE — TRAY INSTR PWR NUVASIVE

## (undated) DEVICE — SUTURE VCRL SZ 2-0 L18IN ABSRB VLT L26MM CT-2

## (undated) DEVICE — MONITORING NEUROPHYSIOLOGICAL

## (undated) DEVICE — C-ARMOR C-ARM EQUIPMENT COVERS CLEAR STERILE UNIVERSAL FIT 12 PER CASE: Brand: C-ARMOR

## (undated) DEVICE — SYSTEM DEL GRFT MOD MAS

## (undated) DEVICE — WRAP THERAPEUTIC BACK WO GEL P

## (undated) DEVICE — 450 ML BOTTLE OF 0.05% CHLORHEXIDINE GLUCONATE IN 99.95% STERILE WATER FOR IRRIGATION, USP AND APPLICATOR.: Brand: IRRISEPT ANTIMICROBIAL WOUND LAVAGE

## (undated) DEVICE — RELINE MAS BLADE FASCIAL SPLIT

## (undated) DEVICE — 3.0MM PRECISION ROUND

## (undated) NOTE — LETTER
OUTSIDE TESTING RESULT REQUEST     IMPORTANT: FOR YOUR IMMEDIATE ATTENTION  Please FAX all test results listed below to: [phone]     Testing already done on or about: 10/27/23     * * * * If testing is NOT complete, arrange with patient A.S.A.P. * * * *      Patient Name: Manuel Trevino  Surgery Date: 2023  Medical Record: [id number]  CSN: [id number]  : 1960 - A: 58 y     Sex: female  Surgeon(s):  Elda Travis MD  Procedure: LUMBAR 3 - LUMBAR 4, LUMBAR 4 - LUMBAR 5 REVISION DECOMPRESSION WITH TRANSFORAMINAL LUMBAR INTERBODY INSTRUMENTED FUSION  Anesthesia Type: General     Surgeon: Elda Travis MD     The following Testing and Time Line are REQUIRED PER ANESTHESIA     EKG READ AND SIGNED WITHIN   90 days  CBC [with Differential & Platelets] within  90 days  CMP (requires 4 hour fast) within  90 days  PT/INR within  30 days  PTT within  30 days  UA with Reflex to Culture within  30 days  MSSA/MRSA Nasal screening within 30 days      Thank You,   Sent by:Honey VIDAL

## (undated) NOTE — LETTER
URGENT: SURGERY IS 2023    OUTSIDE TESTING RESULT REQUEST     IMPORTANT: FOR YOUR IMMEDIATE ATTENTION  Please FAX all test results listed below to: [phone]     Testing already done on or about: 10/31/2023     * * * * If testing is NOT complete, arrange with patient A.S.A.P. * * * *      Patient Name: Radha Blum  Surgery Date: 2023  Medical Record: [id number]  CSN: [id number]  : 1960 - A: 58 y     Sex: female  Surgeon(s):  David Dunlap MD  Procedure: LUMBAR 3 - LUMBAR 4, LUMBAR 4 - LUMBAR 5 REVISION DECOMPRESSION WITH TRANSFORAMINAL LUMBAR INTERBODY INSTRUMENTED FUSION  Anesthesia Type: General     Surgeon: David Dunlap MD     The following Testing and Time Line are REQUIRED PER ANESTHESIA     EKG READ AND SIGNED WITHIN   90 days      Thank Lashonda Richardson,   Sent by: Ana Duverney RN